# Patient Record
Sex: FEMALE | Race: WHITE | NOT HISPANIC OR LATINO | Employment: PART TIME | URBAN - METROPOLITAN AREA
[De-identification: names, ages, dates, MRNs, and addresses within clinical notes are randomized per-mention and may not be internally consistent; named-entity substitution may affect disease eponyms.]

---

## 2017-06-07 ENCOUNTER — HOSPITAL ENCOUNTER (EMERGENCY)
Facility: MEDICAL CENTER | Age: 62
End: 2017-06-07
Attending: EMERGENCY MEDICINE
Payer: COMMERCIAL

## 2017-06-07 ENCOUNTER — APPOINTMENT (OUTPATIENT)
Dept: RADIOLOGY | Facility: MEDICAL CENTER | Age: 62
End: 2017-06-07
Attending: EMERGENCY MEDICINE
Payer: COMMERCIAL

## 2017-06-07 VITALS
DIASTOLIC BLOOD PRESSURE: 92 MMHG | BODY MASS INDEX: 30.04 KG/M2 | WEIGHT: 198.19 LBS | SYSTOLIC BLOOD PRESSURE: 154 MMHG | TEMPERATURE: 97.3 F | HEIGHT: 68 IN | RESPIRATION RATE: 20 BRPM | OXYGEN SATURATION: 96 % | HEART RATE: 83 BPM

## 2017-06-07 DIAGNOSIS — R05.8 PRODUCTIVE COUGH: ICD-10-CM

## 2017-06-07 PROCEDURE — 700111 HCHG RX REV CODE 636 W/ 250 OVERRIDE (IP): Performed by: EMERGENCY MEDICINE

## 2017-06-07 PROCEDURE — 99284 EMERGENCY DEPT VISIT MOD MDM: CPT

## 2017-06-07 PROCEDURE — 71020 DX-CHEST-2 VIEWS: CPT

## 2017-06-07 RX ORDER — PREDNISONE 20 MG/1
40 TABLET ORAL DAILY
Qty: 8 TAB | Refills: 0 | Status: SHIPPED | OUTPATIENT
Start: 2017-06-07 | End: 2017-06-11

## 2017-06-07 RX ORDER — PREDNISONE 20 MG/1
60 TABLET ORAL ONCE
Status: COMPLETED | OUTPATIENT
Start: 2017-06-07 | End: 2017-06-07

## 2017-06-07 RX ORDER — AZITHROMYCIN 250 MG/1
TABLET, FILM COATED ORAL
Qty: 6 TAB | Refills: 0 | Status: SHIPPED | OUTPATIENT
Start: 2017-06-07 | End: 2018-11-30 | Stop reason: CLARIF

## 2017-06-07 RX ADMIN — PREDNISONE 60 MG: 20 TABLET ORAL at 19:03

## 2017-06-07 NOTE — ED AVS SNAPSHOT
RVR Systems Access Code: [email or web address]  Expires: 7/7/2017  7:10 PM    RVR Systems  A secure, online tool to manage your health information     [employer]’s RVR Systems® is a secure, online tool that connects you to your personalized health information from the privacy of your home -- day or night - making it very easy for you to manage your healthcare. Once the activation process is completed, you can even access your medical information using the RVR Systems cynthia, which is available for free in the Apple Cynthia store or Google Play store.     RVR Systems provides the following levels of access (as shown below):   My Chart Features   [facility] Primary Care Doctor [facility]  Specialists [facility]  Urgent  Care Non-[facility]  Primary Care  Doctor   Email your healthcare team securely and privately 24/7 X X X X   Manage appointments: schedule your next appointment; view details of past/upcoming appointments X      Request prescription refills. X      View recent personal medical records, including lab and immunizations X X X X   View health record, including health history, allergies, medications X X X X   Read reports about your outpatient visits, procedures, consult and ER notes X X X X   See your discharge summary, which is a recap of your hospital and/or ER visit that includes your diagnosis, lab results, and care plan. X X       How to register for RVR Systems:  1. Go to  [email or web address].  2. Click on the Sign Up Now box, which takes you to the New Member Sign Up page. You will need to provide the following information:  a. Enter your RVR Systems Access Code exactly as it appears at the top of this page. (You will not need to use this code after you’ve completed the sign-up process. If you do not sign up before the expiration date, you must request a new code.)   b. Enter your date of birth.   c. Enter your home email address.   d. Click Submit, and follow the next screen’s instructions.  3. Create a RVR Systems ID. This will be your RVR Systems  login ID and cannot be changed, so think of one that is secure and easy to remember.  4. Create a [employer] password. You can change your password at any time.  5. Enter your Password Reset Question and Answer. This can be used at a later time if you forget your password.   6. Enter your e-mail address. This allows you to receive e-mail notifications when new information is available in [employer].  7. Click Sign Up. You can now view your health information.    For assistance activating your [employer] account, call [phone]

## 2017-06-07 NOTE — ED AVS SNAPSHOT
6/7/2017    Sunita John  [address] [address]    Dear Lynda:    [facility] wants to ensure your discharge home is safe and you or your loved ones have had all of your questions answered regarding your care after you leave the hospital.    Below is a list of resources and contact information should you have any questions regarding your hospital stay, follow-up instructions, or active medical symptoms.    Questions or Concerns Regarding… Contact   Medical Questions Related to Your Discharge  (7 days a week, 8am-5pm) Contact a Nurse Care Coordinator   [phone]   Medical Questions Not Related to Your Discharge  (24 hours a day / 7 days a week)  Contact the Nurse Health Line   [phone]    Medications or Discharge Instructions Refer to your discharge packet   or contact your [facility] Primary Care Provider   [phone]   Follow-up Appointment(s) Schedule your appointment via [employer]   or contact Scheduling [phone]   Billing Review your statement via [employer]Fish  or contact Billing [phone]   Medical Records Review your records via [employer]   or contact Medical Records [phone]     You may receive a telephone call within two days of discharge. This call is to make certain you understand your discharge instructions and have the opportunity to have any questions answered. You can also easily access your medical information, test results and upcoming appointments via the [employer] free online health management tool. You can learn more and sign up at [employer]/PixelFish. For assistance setting up your [employer]Fish account, please call [phone].    Once again, we want to ensure your discharge home is safe and that you have a clear understanding of any next steps in your care. If you have any questions or concerns, please do not hesitate to contact us, we are here for you. Thank you for choosing [facility] for your healthcare needs.    Sincerely,    Your [facility] Healthcare Team

## 2017-06-07 NOTE — ED AVS SNAPSHOT
Home Care Instructions                                                                                                                Lynda Lopez   MRN: [id number]    Department:  [facility], Emergency Dept   Date of Visit:  6/7/2017            [facility], Emergency Dept    [address]    [place] [address]    Phone:  [phone]      You were seen by     Gary Murphy M.D.      Your Diagnosis Was     Productive cough     R05       These are the medications you received during your hospitalization from 06/07/2017 1620 to 06/07/2017 1910     Date/Time Order Dose Route Action    06/07/2017 1903 predniSONE (DELTASONE) tablet 60 mg 60 mg Oral Given      Follow-up Information     1. Schedule an appointment as soon as possible for a visit with [facility].    Contact information    [facility] [facility]  [place] [address] [phone]      Medication Information     Review all of your home medications and newly ordered medications with your primary doctor and/or pharmacist as soon as possible. Follow medication instructions as directed by your doctor and/or pharmacist.     Please keep your complete medication list with you and share with your physician. Update the information when medications are discontinued, doses are changed, or new medications (including over-the-counter products) are added; and carry medication information at all times in the event of emergency situations.               Medication List      START taking these medications        Instructions    Morning Afternoon Evening Bedtime    azithromycin 250 MG Tabs   Commonly known as:  ZITHROMAX Z-BRONSON        Take as directed                        predniSONE 20 MG Tabs   Last time this was given:  60 mg on 6/7/2017  7:03 PM   Commonly known as:  DELTASONE        Take 2 Tabs by mouth every day for 4 days. Start medicine tomorrow   Dose:  40 mg                                Where to Get Your Medications      You can get these medications from any pharmacy     Bring a paper prescription for each of these medications    - azithromycin 250 MG Tabs  - predniSONE 20 MG Tabs            Procedures and tests performed during your visit     DX-CHEST-2 VIEWS        Discharge Instructions       Cough, Adult   A cough is a reflex that helps clear your throat and airways. It can help heal the body or may be a reaction to an irritated airway. A cough may only last 2 or 3 weeks (acute) or may last more than 8 weeks (chronic).   CAUSES  Acute cough:  · Viral or bacterial infections.  Chronic cough:  · Infections.  · Allergies.  · Asthma.  · Post-nasal drip.  · Smoking.  · Heartburn or acid reflux.  · Some medicines.  · Chronic lung problems (COPD).  · Cancer.  SYMPTOMS   · Cough.  · Fever.  · Chest pain.  · Increased breathing rate.  · High-pitched whistling sound when breathing (wheezing).  · Colored mucus that you cough up (sputum).  TREATMENT   · A bacterial cough may be treated with antibiotic medicine.  · A viral cough must run its course and will not respond to antibiotics.  · Your caregiver may recommend other treatments if you have a chronic cough.  HOME CARE INSTRUCTIONS   · Only take over-the-counter or prescription medicines for pain, discomfort, or fever as directed by your caregiver. Use cough suppressants only as directed by your caregiver.  · Use a cold steam vaporizer or humidifier in your bedroom or home to help loosen secretions.  · Sleep in a semi-upright position if your cough is worse at night.  · Rest as needed.  · Stop smoking if you smoke.  SEEK IMMEDIATE MEDICAL CARE IF:   · You have pus in your sputum.  · Your cough starts to worsen.  · You cannot control your cough with suppressants and are losing sleep.  · You begin coughing up blood.  · You have difficulty breathing.  · You develop pain which is getting worse or is uncontrolled with medicine.  · You have a fever.  MAKE  SURE YOU:   · Understand these instructions.  · Will watch your condition.  · Will get help right away if you are not doing well or get worse.     This information is not intended to replace advice given to you by your health care provider. Make sure you discuss any questions you have with your health care provider.     Document Released: 06/15/2012 Document Revised: 03/11/2013 Document Reviewed: 02/24/2016  [employer] Interactive Patient Education ©2016 [employer]            Patient Information     Patient Information    Following emergency treatment: all patient requiring follow-up care must return either to a private physician or a clinic if your condition worsens before you are able to obtain further medical attention, please return to the emergency room.     Billing Information    At [facility], we work to make the billing process streamlined for our patients.  Our Representatives are here to answer any questions you may have regarding your hospital bill.  If you have insurance coverage and have supplied your insurance information to us, we will submit a claim to your insurer on your behalf.  Should you have any questions regarding your bill, we can be reached online or by phone as follows:  Online: You are able pay your bills online or live chat with our representatives about any billing questions you may have. We are here to help Monday - Friday from 8:00am to 7:30pm and 9:00am - 12:00pm on Saturdays.  Please visit [email or web address]  for more information.   Phone:  [phone] or [phone]    Please note that your emergency physician, surgeon, pathologist, radiologist, anesthesiologist, and other specialists are not employed by [facility] and will therefore bill separately for their services.  Please contact them directly for any questions concerning their bills at the numbers below:     Emergency Physician Services:  [phone]  [facility]:   [phone]  [facility]:  [phone]  [facility]:  [phone]    1. Your final bill may vary from the amount quoted upon discharge if all procedures are not complete at that time, or if your doctor has additional procedures of which we are not aware. You will receive an additional bill if you return to the Emergency Department at [facility] for suture removal regardless of the facility of which the sutures were placed.     2. Please arrange for settlement of this account at the emergency registration.    3. All self-pay accounts are due in full at the time of treatment.  If you are unable to meet this obligation then payment is expected within 4-5 days.     4. If you have had radiology studies (CT, X-ray, Ultrasound, MRI), you have received a preliminary result during your emergency department visit. Please contact the radiology department [phone] to receive a copy of your final result. Please discuss the Final result with your primary physician or with the follow up physician provided.     Crisis Hotline:  [place] Crisis Hotline:  0-841-EUGQBSR or 1-946.408.9910  [place] Crisis Hotline:    1-185.652.3335 or 686-802-8667         ED Discharge Follow Up Questions    1. In order to provide you with very good care, we would like to follow up with a phone call in the next few days.  May we have your permission to contact you?     YES /  NO    2. What is the best phone number to call you? (       )_____-__________    3. What is the best time to call you?      Morning  /  Afternoon  /  Evening                   Patient Signature:  ____________________________________________________________    Date:  ____________________________________________________________

## 2017-06-07 NOTE — ED NOTES
"Chief Complaint   Patient presents with   • Sore Throat     x 4 weeks   • Cough     x 4 weeks   • Congestion     x 4 weeks, states is getting worse     /92 mmHg  Pulse 88  Temp(Src) 36.3 °C (97.3 °F)  Resp 20  Ht 1.727 m (5' 7.99\")  Wt 89.9 kg (198 lb 3.1 oz)  BMI 30.14 kg/m2  SpO2 97%    "

## 2017-06-08 NOTE — ED PROVIDER NOTES
ED Provider Note    CHIEF COMPLAINT  Chief Complaint   Patient presents with   • Sore Throat     x 4 weeks   • Cough     x 4 weeks   • Congestion     x 4 weeks, states is getting worse       HPI  Lynda Lopez is a 62 y.o. female who presents with cough, productive yellow sputum onset 1 month ago.  She states several family members were sick, they got better and she did not.  She has inhalers from previous lung difficulties.  She however denies asthma or COPD.  She denies smoking.  No chest pain.  She states she had chest soreness last week secondary to coughing, this has resolved over the past 6 days.  No asymmetric leg swelling, no history of DVT, no pleurisy.  No history of hemoptysis.  Secondary to persistent cough and productivity, she presents for evaluation.    REVIEW OF SYSTEMS  Constitutional: No fever  Respiratory: Productive cough  ENT no facial pain  Gastrointestinal: No abdominal pain  Musculoskeletal: Previous chest wall pain now resolved, No back pain    PAST MEDICAL HISTORY  Past Medical History   Diagnosis Date   • Depression    • Anxiety    • Varicose vein of leg        FAMILY HISTORY  History reviewed. No pertinent family history.    SOCIAL HISTORY  Social History     Social History   • Marital Status:      Spouse Name: N/A   • Number of Children: N/A   • Years of Education: N/A     Social History Main Topics   • Smoking status: Never Smoker    • Smokeless tobacco: None   • Alcohol Use: Yes   • Drug Use: No   • Sexual Activity: Not Asked     Other Topics Concern   • None     Social History Narrative   • None       SURGICAL HISTORY  History reviewed. No pertinent past surgical history.    CURRENT MEDICATIONS  No current facility-administered medications on file prior to encounter.     No current outpatient prescriptions on file prior to encounter.       ALLERGIES  Allergies   Allergen Reactions   • Erythromycin        PHYSICAL EXAM  VITAL SIGNS: /92 mmHg  Pulse 83  Temp(Src)  "36.3 °C (97.3 °F)  Resp 20  Ht 1.727 m (5' 7.99\")  Wt 89.9 kg (198 lb 3.1 oz)  BMI 30.14 kg/m2  SpO2 96%  Constitutional:  Well nourished, No acute distress.   HENT: The face is atraumatic, posterior pharynx normal  Lymphatics: No adenopathy  Eyes:  Conjunctiva normal, No discharge.    Cardiovascular: The heart is regular, no murmurs  Pulmonary: Lungs are clear, no wheezing, slight rhonchi right midlung.  Moderate air movement bilateral  Skin: No cyanosis.  No peripheral edema  Musculoskeletal: Neck nontender   Neurologic: speech is clear, no ataxia   Psychiatric:  Mood normal.     DX-CHEST-2 VIEWS   Final Result      Negative two views of the chest.              COURSE & MEDICAL DECISION MAKING  Pertinent Labs & Imaging studies reviewed. (See chart for details)  Patient has negative chest x-ray however productive cough, ongoing for 1 month.  Plan is for Medrol Dosepak as this may be related to post viral inflammation.  She states she has her own inhalers and does not require refill.  Patient also prescribed Zithromax.  Patient has agreed to return if worse.  No evidence of CHF or edema on exam.  There is no exertional chest pain, unlikely cardiac in nature given her symptoms over one month.    FINAL IMPRESSION     1. Productive cough                 Electronically signed by: Gary Murphy, 6/7/2017 10:12 PM    "

## 2017-06-08 NOTE — DISCHARGE INSTRUCTIONS
Cough, Adult   A cough is a reflex that helps clear your throat and airways. It can help heal the body or may be a reaction to an irritated airway. A cough may only last 2 or 3 weeks (acute) or may last more than 8 weeks (chronic).   CAUSES  Acute cough:  · Viral or bacterial infections.  Chronic cough:  · Infections.  · Allergies.  · Asthma.  · Post-nasal drip.  · Smoking.  · Heartburn or acid reflux.  · Some medicines.  · Chronic lung problems (COPD).  · Cancer.  SYMPTOMS   · Cough.  · Fever.  · Chest pain.  · Increased breathing rate.  · High-pitched whistling sound when breathing (wheezing).  · Colored mucus that you cough up (sputum).  TREATMENT   · A bacterial cough may be treated with antibiotic medicine.  · A viral cough must run its course and will not respond to antibiotics.  · Your caregiver may recommend other treatments if you have a chronic cough.  HOME CARE INSTRUCTIONS   · Only take over-the-counter or prescription medicines for pain, discomfort, or fever as directed by your caregiver. Use cough suppressants only as directed by your caregiver.  · Use a cold steam vaporizer or humidifier in your bedroom or home to help loosen secretions.  · Sleep in a semi-upright position if your cough is worse at night.  · Rest as needed.  · Stop smoking if you smoke.  SEEK IMMEDIATE MEDICAL CARE IF:   · You have pus in your sputum.  · Your cough starts to worsen.  · You cannot control your cough with suppressants and are losing sleep.  · You begin coughing up blood.  · You have difficulty breathing.  · You develop pain which is getting worse or is uncontrolled with medicine.  · You have a fever.  MAKE SURE YOU:   · Understand these instructions.  · Will watch your condition.  · Will get help right away if you are not doing well or get worse.     This information is not intended to replace advice given to you by your health care provider. Make sure you discuss any questions you have with your health care provider.      Document Released: 06/15/2012 Document Revised: 03/11/2013 Document Reviewed: 02/24/2016  [employer] Interactive Patient Education ©2016 [employer]

## 2018-11-30 ENCOUNTER — HOSPITAL ENCOUNTER (EMERGENCY)
Facility: MEDICAL CENTER | Age: 63
End: 2018-11-30
Attending: EMERGENCY MEDICINE
Payer: COMMERCIAL

## 2018-11-30 VITALS
RESPIRATION RATE: 18 BRPM | BODY MASS INDEX: 30.1 KG/M2 | SYSTOLIC BLOOD PRESSURE: 144 MMHG | OXYGEN SATURATION: 96 % | TEMPERATURE: 97.8 F | WEIGHT: 191.8 LBS | DIASTOLIC BLOOD PRESSURE: 93 MMHG | HEART RATE: 84 BPM | HEIGHT: 67 IN

## 2018-11-30 DIAGNOSIS — L08.9 WOUND INFECTION: ICD-10-CM

## 2018-11-30 DIAGNOSIS — T14.8XXA WOUND INFECTION: ICD-10-CM

## 2018-11-30 PROCEDURE — A9270 NON-COVERED ITEM OR SERVICE: HCPCS | Performed by: EMERGENCY MEDICINE

## 2018-11-30 PROCEDURE — 99284 EMERGENCY DEPT VISIT MOD MDM: CPT

## 2018-11-30 PROCEDURE — 700102 HCHG RX REV CODE 250 W/ 637 OVERRIDE(OP): Performed by: EMERGENCY MEDICINE

## 2018-11-30 PROCEDURE — 303485 HCHG DRESSING MEDIUM

## 2018-11-30 RX ORDER — CEPHALEXIN 500 MG/1
500 CAPSULE ORAL 4 TIMES DAILY
Qty: 40 CAP | Refills: 0 | Status: SHIPPED | OUTPATIENT
Start: 2018-11-30 | End: 2018-12-10

## 2018-11-30 RX ORDER — CEPHALEXIN 250 MG/1
500 CAPSULE ORAL ONCE
Status: COMPLETED | OUTPATIENT
Start: 2018-11-30 | End: 2018-11-30

## 2018-11-30 RX ORDER — FUROSEMIDE 20 MG/1
20 TABLET ORAL EVERY MORNING
COMMUNITY

## 2018-11-30 RX ORDER — SULFAMETHOXAZOLE AND TRIMETHOPRIM 800; 160 MG/1; MG/1
1 TABLET ORAL ONCE
Status: COMPLETED | OUTPATIENT
Start: 2018-11-30 | End: 2018-11-30

## 2018-11-30 RX ORDER — SULFAMETHOXAZOLE AND TRIMETHOPRIM 800; 160 MG/1; MG/1
1 TABLET ORAL 2 TIMES DAILY
Qty: 20 TAB | Refills: 0 | Status: SHIPPED | OUTPATIENT
Start: 2018-11-30 | End: 2018-12-10

## 2018-11-30 RX ORDER — DOXYCYCLINE HYCLATE 100 MG
100 TABLET ORAL 2 TIMES DAILY
COMMUNITY
Start: 2018-11-20

## 2018-11-30 RX ADMIN — CEPHALEXIN 500 MG: 250 CAPSULE ORAL at 11:15

## 2018-11-30 RX ADMIN — SULFAMETHOXAZOLE AND TRIMETHOPRIM 1 TABLET: 800; 160 TABLET ORAL at 11:15

## 2018-11-30 ASSESSMENT — PAIN SCALES - GENERAL: PAINLEVEL_OUTOF10: 4

## 2018-11-30 NOTE — ED NOTES
Med rec complete per pt at bedside  Allergies have been verified and updated    Pt reports that she started DOXYCYCLINE 10 day course on 11-    Pt reports that about a week ago she took AMOXICILLIN every other day (5 capsules) that she got from her daughter.     Pt reports that she has not taken her LEVOTHYROXINE 100 mcg or ZOLOFT 100 mg in the last 2 months.

## 2018-11-30 NOTE — ED NOTES
Dressing applied to wound per ERP orders. Prescriptions provided and discussed with patient, pt verbalized understandin.  Pt verbalized the understanding of discharge instructions to follow up with PCP and to return to ER if condition worsens.  Pt ambulated out of ER without difficulty.

## 2018-11-30 NOTE — ED NOTES
ERP at bedside. Pt agrees with plan of care discussed by ERP. AIDET acknowledged with patient. Blanca in low position, side rail up for pt safety. Call light within reach. Will continue to monitor.

## 2018-11-30 NOTE — ED TRIAGE NOTES
Pt presents complaning of left lower leg wound infection developing for the past 3 weeks.  She was seen by her PCP this past Tuesday, and started on doxycycline.  Denies fever.

## 2018-11-30 NOTE — ED PROVIDER NOTES
ED Provider Note    CHIEF COMPLAINT   Chief Complaint   Patient presents with   • Wound Infection       HPI   Lynda Longoria is a 63 y.o. female who presents to the emergency department with concerns for a wound infection on the left lower leg.  The patient says that she had what sounds like some varicose veins in the left leg that subsequently became irritated.  Subsequently she developed an area of pain and redness.  She saw her physician 2 days ago and they gave her a prescription for doxycycline.  She is taken several doses of this but does not feel like she is getting better.  She was told by 1 of the pharmacist that she should not going to work because she might lose her leg.  The patient was very concerned and anxious about this and decided to come to the emergency department for further evaluation given that she did not feel like that she was improving.  She denies any fevers.  She localizes her pain to an area of redness and a wound just proximal to the medial malleolus on the left lower leg.    REVIEW OF SYSTEMS   See HPI for further details. All other systems are negative.     PAST MEDICAL HISTORY   Past Medical History:   Diagnosis Date   • Anxiety    • Depression    • Varicose vein of leg        FAMILY HISTORY  History reviewed. No pertinent family history.    SOCIAL HISTORY  Social History     Social History   • Marital status:      Spouse name: N/A   • Number of children: N/A   • Years of education: N/A     Social History Main Topics   • Smoking status: Never Smoker   • Smokeless tobacco: Never Used   • Alcohol use Yes      Comment: Occasionally   • Drug use: No   • Sexual activity: Not on file     Other Topics Concern   • Not on file     Social History Narrative   • No narrative on file        SURGICAL HISTORY  History reviewed. No pertinent surgical history.    CURRENT MEDICATIONS   Home Medications     Reviewed by Luisito Hidalgo (Pharmacy Tech) on 11/30/18 at [facility]  "Status: Complete   Medication Last Dose Status   AMOXICILLIN PO >1week Active   doxycycline (VIBRAMYCIN) 100 MG Tab 11/30/2018 Active   furosemide (LASIX) 20 MG Tab 11/29/2018 Active                ALLERGIES   Allergies   Allergen Reactions   • Erythromycin Vomiting       PHYSICAL EXAM  VITAL SIGNS: /93   Pulse 84   Temp 36.6 °C (97.8 °F) (Temporal)   Resp 18   Ht 1.702 m (5' 7\")   Wt 87 kg (191 lb 12.8 oz)   SpO2 96%   BMI 30.04 kg/m²   Constitutional: Well developed, Well nourished, No acute distress, Non-toxic appearance.   Cardiovascular: Normal heart rate, Normal rhythm, No murmurs, No rubs, No gallops.   Thorax & Lungs: Normal breath sounds, No respiratory distress, No wheezing, No chest tenderness.   Skin: There is a quarter size wound just proximal to the medial malleolus in the left lower 4 cm of surrounding erythema.  There is no purulent discharge.  There is no ascending lymphangitis.  No tenderness in the calf.  No palpable cords.  Extremities: Intact distal pulses, No edema, No tenderness, No cyanosis, No clubbing.       COURSE & MEDICAL DECISION MAKING  Pertinent Labs & Imaging studies reviewed. (See chart for details)  The patient presents today with an apparent wound infection.  She is been taking doxycycline for a day and a half and does not seem to be improving.  I feel that she may benefit from coverage with Bactrim and Keflex instead of the doxycycline.  We will also give her a prescription for Bactroban.  The patient verbalized understanding.  Erythema was outlined in the emergency department.  Dressing was applied.  Local wound care is performed.  Patient was instructed on this and return precautions were discussed.  She verbalized understanding.  Discharged home in stable condition.    The patient will return for new or worsening symptoms and is stable at the time of discharge.    The patient is referred to a primary physician for blood pressure management, diabetic screening, and " for all other preventative health concerns.      DISPOSITION:  Patient will be discharged home in stable condition.    FOLLOW UP:  [facility], Emergency Dept  [address]  [place] [id number] [phone]    If symptoms worsen    your doctor    Schedule an appointment as soon as possible for a visit       [facility] [place]  [address]  [place] [address]  [phone]    Call today  if you need help paying for antibiotics      OUTPATIENT MEDICATIONS:  Discharge Medication List as of 11/30/2018 11:24 AM      START taking these medications    Details   sulfamethoxazole-trimethoprim (BACTRIM DS) 800-160 MG tablet Take 1 Tab by mouth 2 times a day for 10 days., Disp-20 Tab, R-0, Print Rx Paper      cephALEXin (KEFLEX) 500 MG Cap Take 1 Cap by mouth 4 times a day for 10 days., Disp-40 Cap, R-0, Print Rx Paper      mupirocin (BACTROBAN) 2 % Ointment Apply to the affected area twice daily for 7 days, Disp-1 Tube, R-1, Print Rx Paper               FINAL IMPRESSION  1. Wound infection                  Electronically signed by: Chintan Vegas, 11/30/2018 11:53 AM

## 2021-03-03 DIAGNOSIS — Z23 NEED FOR VACCINATION: ICD-10-CM
